# Patient Record
Sex: FEMALE | Race: WHITE | NOT HISPANIC OR LATINO | Employment: OTHER | URBAN - METROPOLITAN AREA
[De-identification: names, ages, dates, MRNs, and addresses within clinical notes are randomized per-mention and may not be internally consistent; named-entity substitution may affect disease eponyms.]

---

## 2023-03-08 DIAGNOSIS — M79.7 FIBROMYALGIA: Primary | ICD-10-CM

## 2023-03-08 RX ORDER — DULOXETIN HYDROCHLORIDE 60 MG/1
60 CAPSULE, DELAYED RELEASE ORAL 2 TIMES DAILY
Qty: 60 CAPSULE | Refills: 1 | Status: SHIPPED | OUTPATIENT
Start: 2023-03-08 | End: 2023-04-03 | Stop reason: SDUPTHER

## 2023-03-08 RX ORDER — DULOXETIN HYDROCHLORIDE 60 MG/1
1 CAPSULE, DELAYED RELEASE ORAL 2 TIMES DAILY
COMMUNITY
Start: 2014-03-03 | End: 2023-03-08 | Stop reason: SDUPTHER

## 2023-04-03 DIAGNOSIS — M79.7 FIBROMYALGIA: ICD-10-CM

## 2023-04-03 RX ORDER — DULOXETIN HYDROCHLORIDE 20 MG/1
2 CAPSULE, DELAYED RELEASE ORAL
COMMUNITY
Start: 2023-01-13 | End: 2023-04-03 | Stop reason: SDUPTHER

## 2023-04-03 RX ORDER — DULOXETIN HYDROCHLORIDE 20 MG/1
40 CAPSULE, DELAYED RELEASE ORAL
Qty: 60 CAPSULE | Refills: 11 | Status: SHIPPED | OUTPATIENT
Start: 2023-04-03 | End: 2023-04-26 | Stop reason: SDUPTHER

## 2023-04-26 DIAGNOSIS — M79.7 FIBROMYALGIA: ICD-10-CM

## 2023-04-26 RX ORDER — DULOXETIN HYDROCHLORIDE 20 MG/1
40 CAPSULE, DELAYED RELEASE ORAL
Qty: 180 CAPSULE | Refills: 3 | Status: SHIPPED | OUTPATIENT
Start: 2023-04-26 | End: 2024-03-06 | Stop reason: SDUPTHER

## 2023-05-08 ENCOUNTER — PATIENT OUTREACH (OUTPATIENT)
Dept: CARE COORDINATION | Facility: CLINIC | Age: 63
End: 2023-05-08
Payer: COMMERCIAL

## 2023-05-08 ENCOUNTER — DOCUMENTATION (OUTPATIENT)
Dept: PRIMARY CARE | Facility: CLINIC | Age: 63
End: 2023-05-08
Payer: COMMERCIAL

## 2023-05-08 RX ORDER — OMEPRAZOLE 40 MG/1
40 CAPSULE, DELAYED RELEASE ORAL
COMMUNITY
End: 2023-05-12 | Stop reason: WASHOUT

## 2023-05-08 RX ORDER — CHOLECALCIFEROL (VITAMIN D3) 25 MCG
2000 TABLET ORAL
COMMUNITY
Start: 2023-01-13 | End: 2024-06-06 | Stop reason: WASHOUT

## 2023-05-08 RX ORDER — ATORVASTATIN CALCIUM 10 MG/1
10 TABLET, FILM COATED ORAL
COMMUNITY
Start: 2021-02-01 | End: 2023-06-12

## 2023-05-08 RX ORDER — LISINOPRIL 10 MG/1
1 TABLET ORAL DAILY
COMMUNITY
Start: 2014-10-15 | End: 2023-07-12

## 2023-05-08 NOTE — PROGRESS NOTES
Discharge Facility: [facility]  Discharge Diagnosis: pancreatitis  Admission Date: 5/5/23  Discharge Date: 5/7/23    PCP Appointment Date: 5/12/23  Specialist Appointment Date:  TBD with GI  Hospital Encounter and Summary: Linked   See discharge assessment below for further details    Engagement  Call Start Time: 0959 (5/8/2023 10:11 AM)    Medications  Medications reviewed with patient/caregiver?: Yes (5/8/2023 10:11 AM)  Is the patient having any side effects they believe may be caused by any medication additions or changes?: No (5/8/2023 10:11 AM)  Does the patient have all medications ordered at discharge?: Yes (5/8/2023 10:11 AM)  Care Management Interventions: No intervention needed (5/8/2023 10:11 AM)  Prescription Comments: No changes to medications (5/8/2023 10:11 AM)  Is the patient taking all medications as directed (includes completed medication regime)?: Yes (5/8/2023 10:11 AM)    Appointments  Does the patient have a primary care provider?: Yes (5/8/2023 10:11 AM)  Care Management Interventions: Verified appointment date/time/provider (5/8/2023 10:11 AM)  Has the patient kept scheduled appointments due by today?: Yes (5/8/2023 10:11 AM)  Follow Up Tasks: -- (She will follow with her GI provider) (5/8/2023 10:11 AM)    Patient Teaching  Does the patient have access to their discharge instructions?: Yes (5/8/2023 10:11 AM)  Care Management Interventions: Reviewed instructions with patient (5/8/2023 10:11 AM)  What is the patient's perception of their health status since discharge?: Improving (5/8/2023 10:11 AM)  Is the patient/caregiver able to teach back the hierarchy of who to call/visit for symptoms/problems? PCP, Specialist, Home Health nurse, Urgent Care, ED, 911: Yes (5/8/2023 10:11 AM)  Patient/Caregiver Education Comments: Patient is doing better, tolerating small amounts of food gradually in order to prevent pancreatitis flare up. (5/8/2023 10:11 AM)    Wrap Up  Call End Time: 1007  (5/8/2023 10:11 AM)

## 2023-05-09 PROBLEM — R25.1 TREMOR OF BOTH HANDS: Status: ACTIVE | Noted: 2023-05-09

## 2023-05-09 PROBLEM — F33.1 MAJOR DEPRESSIVE DISORDER, RECURRENT EPISODE, MODERATE WITH ANXIOUS DISTRESS (MULTI): Status: ACTIVE | Noted: 2023-05-09

## 2023-05-09 PROBLEM — R73.9 ELEVATED BLOOD SUGAR: Status: ACTIVE | Noted: 2023-05-07

## 2023-05-09 PROBLEM — M17.9 OA (OSTEOARTHRITIS) OF KNEE: Status: ACTIVE | Noted: 2019-08-13

## 2023-05-09 PROBLEM — N95.1 PERIMENOPAUSAL SYMPTOMS: Status: ACTIVE | Noted: 2023-05-09

## 2023-05-09 PROBLEM — E66.9 OBESITY, CLASS II, BMI 35-39.9: Status: ACTIVE | Noted: 2020-10-15

## 2023-05-09 PROBLEM — K76.0 HEPATIC STEATOSIS: Status: ACTIVE | Noted: 2023-05-07

## 2023-05-09 PROBLEM — N60.01 CYST OF BOTH BREASTS: Status: ACTIVE | Noted: 2023-05-09

## 2023-05-09 PROBLEM — F32.A DEPRESSION: Status: ACTIVE | Noted: 2023-05-07

## 2023-05-09 PROBLEM — E78.00 HYPERCHOLESTEREMIA: Status: ACTIVE | Noted: 2023-05-09

## 2023-05-09 PROBLEM — D36.13: Status: ACTIVE | Noted: 2023-05-09

## 2023-05-09 PROBLEM — J30.9 ALLERGIC RHINITIS: Status: ACTIVE | Noted: 2023-05-09

## 2023-05-09 PROBLEM — R92.8 ABNORMAL MAMMOGRAM: Status: ACTIVE | Noted: 2023-05-09

## 2023-05-09 PROBLEM — K58.9 IBS (IRRITABLE BOWEL SYNDROME): Status: ACTIVE | Noted: 2023-05-07

## 2023-05-09 PROBLEM — G47.33 OSA (OBSTRUCTIVE SLEEP APNEA): Status: ACTIVE | Noted: 2023-05-09

## 2023-05-09 PROBLEM — M19.90 ARTHRITIS: Status: ACTIVE | Noted: 2023-05-07

## 2023-05-09 PROBLEM — K21.9 ESOPHAGEAL REFLUX: Status: ACTIVE | Noted: 2023-05-09

## 2023-05-09 PROBLEM — E66.813 CLASS 3 SEVERE OBESITY IN ADULT: Status: ACTIVE | Noted: 2023-05-07

## 2023-05-09 PROBLEM — F33.2 MODERATELY SEVERE RECURRENT MAJOR DEPRESSION (MULTI): Status: ACTIVE | Noted: 2023-05-09

## 2023-05-09 PROBLEM — I10 PRIMARY HYPERTENSION: Status: ACTIVE | Noted: 2023-05-07

## 2023-05-09 PROBLEM — M17.0 PRIMARY OSTEOARTHRITIS OF BOTH KNEES: Status: ACTIVE | Noted: 2019-01-29

## 2023-05-09 PROBLEM — I65.29 CAROTID ARTERY STENOSIS: Status: ACTIVE | Noted: 2023-05-09

## 2023-05-09 PROBLEM — E66.01 CLASS 3 SEVERE OBESITY IN ADULT (MULTI): Status: ACTIVE | Noted: 2023-05-07

## 2023-05-09 PROBLEM — N60.02 CYST OF BOTH BREASTS: Status: ACTIVE | Noted: 2023-05-09

## 2023-05-09 PROBLEM — E66.812 OBESITY, CLASS II, BMI 35-39.9: Status: ACTIVE | Noted: 2020-10-15

## 2023-05-09 PROBLEM — K85.90 PANCREATITIS, UNSPECIFIED PANCREATITIS TYPE (HHS-HCC): Status: ACTIVE | Noted: 2023-05-05

## 2023-05-09 PROBLEM — F41.9 ANXIETY, MILD: Status: ACTIVE | Noted: 2023-05-09

## 2023-05-09 NOTE — PROGRESS NOTES
"  Subjective:    Verito Plasencia is a 62 y.o. female who presents for   Chief Complaint   Patient presents with    Hospital Follow-up         HPI:        Name of GI specialist and date of follow up-   Dr. Orantes- does not have apt set up with him yet   [facility] - pt will schedule       Pt states that she does not have the pain that she had last week- but states that she is \"weird/odd\"    Pt is here for f/up hosp-5/7-5/12/23    Dx - pancreatitis   HTN  Elevated blood sugar- 101 , Oldc5x=0.7  Hyponatremia-133  Elevated bili-2.0  Hepatic steatosis  fibromyalgia      Testing- flu, RSV and COVID neg   Trig=60    CT abd/pelvis- pancreatitis vs duodenitis    Abd Ul/s- no acute changes, possible fatty liver     /85          Hx depression- stable on duloxetine       Social History     Tobacco Use   Smoking Status Never   Smokeless Tobacco Never   Vaping Use   Vaping Status Not on file         Review of Systems:      Objective:    Vitals:    05/12/23 1401   BP: 98/66   BP Location: Left arm   Patient Position: Sitting   BP Cuff Size: Large adult   Pulse: 78   Resp: 14   Temp: 36.1 °C (97 °F)   TempSrc: Temporal   SpO2: 97%   Weight: 113 kg (248 lb 4.8 oz)     Discussed BP is on the low side    No fevers      Pt is A and O x3, NAD, nontoxic, well-hydrated   Head- normocephalic and atraumatic,   EYES- conjunctiva- normal   lids- normal  EARS/NOSE- normal external exam   CV- RRR without murmur  PULM- CTA bilaterally, normal respiratory effort  RESPIRATORY EFFORT- normal , no retractions or nasal flaring   ABD- normoactive BS's , soft, no HSM, no CVAT, NT   EXT- no edema,NT  SKIN- no abnormal skin lesions noted  NEURO- no focal deficits  PSYCH- pleasant, normal judgement and insight        The number and complexity of problems addressed is considered moderate.  The amount and/or complexity of data reviewed and analyzed is considered moderate. The risk of complications and/or morbidity/mortality of patient is " considered moderate. Overall, this patient encounter is considered a moderate risk visit.    Side Effects Medication-     Common side effects or health problems may include:    Headache  Nausea  Insomnia  Weakness  Dizziness  Ejaculation disorder  Drowsiness  Dry mouth  Sweating  Loss of appetite  Nervousness  Inability to orgasm  Weight loss  Abnormal vision  High blood pressure (hypertension)  Impotence  Numbness and tingling  Tremor  Vasodilation  Vomiting  Weight gain  Gas (flatulence)  Itching  Yawning  Indigestion  Twitching  Dilated pupils  Other side effects of venlafaxine include:    Agranulocytosis  Anemia  Aneurism  Bacteremia  Fainting  Muscle weakness  Reduced urination  Skin swelling  Suicide ideation/attempt          Assessment/Plan:    1. Hospital discharge follow-up        2. Acute pancreatitis, unspecified complication status, unspecified pancreatitis type        3. Primary hypertension  Basic Metabolic Panel      4. Elevated blood sugar        5. Prediabetes        6. Hyponatremia        7. Elevated bilirubin  Hepatic Function Panel      8. Fibromyalgia        9. VERO (obstructive sleep apnea)        10. Depression, unspecified depression type        11. Hepatic steatosis  Hepatic Function Panel          Orders Placed This Encounter   Procedures    Basic Metabolic Panel    Hepatic Function Panel       Follow up GI- as directed     Increase fluids /electrolytes       Call if no better or if symptoms worsen      Follow up 2-3 weeks - recheck BP

## 2023-05-10 PROBLEM — F33.1 MAJOR DEPRESSIVE DISORDER, RECURRENT EPISODE, MODERATE WITH ANXIOUS DISTRESS (MULTI): Chronic | Status: ACTIVE | Noted: 2023-05-09

## 2023-05-10 PROBLEM — F41.9 ANXIETY, MILD: Chronic | Status: ACTIVE | Noted: 2023-05-09

## 2023-05-10 PROBLEM — G47.33 OSA (OBSTRUCTIVE SLEEP APNEA): Chronic | Status: ACTIVE | Noted: 2023-05-09

## 2023-05-10 PROBLEM — F32.A DEPRESSION: Chronic | Status: ACTIVE | Noted: 2023-05-07

## 2023-05-10 PROBLEM — R73.9 ELEVATED BLOOD SUGAR: Chronic | Status: ACTIVE | Noted: 2023-05-07

## 2023-05-10 PROBLEM — M17.9 OA (OSTEOARTHRITIS) OF KNEE: Chronic | Status: ACTIVE | Noted: 2019-08-13

## 2023-05-10 PROBLEM — I65.29 CAROTID ARTERY STENOSIS: Chronic | Status: ACTIVE | Noted: 2023-05-09

## 2023-05-10 PROBLEM — R17 ELEVATED BILIRUBIN: Status: ACTIVE | Noted: 2023-05-10

## 2023-05-10 PROBLEM — E87.1 HYPONATREMIA: Status: ACTIVE | Noted: 2023-05-10

## 2023-05-10 PROBLEM — M19.90 ARTHRITIS: Chronic | Status: ACTIVE | Noted: 2023-05-07

## 2023-05-10 PROBLEM — J30.9 ALLERGIC RHINITIS: Chronic | Status: ACTIVE | Noted: 2023-05-09

## 2023-05-10 PROBLEM — R92.8 ABNORMAL MAMMOGRAM: Chronic | Status: ACTIVE | Noted: 2023-05-09

## 2023-05-10 PROBLEM — K58.9 IBS (IRRITABLE BOWEL SYNDROME): Chronic | Status: ACTIVE | Noted: 2023-05-07

## 2023-05-10 PROBLEM — I10 PRIMARY HYPERTENSION: Chronic | Status: ACTIVE | Noted: 2023-05-07

## 2023-05-10 PROBLEM — M79.7 FIBROMYALGIA: Chronic | Status: ACTIVE | Noted: 2023-03-08

## 2023-05-10 PROBLEM — E78.00 HYPERCHOLESTEREMIA: Chronic | Status: ACTIVE | Noted: 2023-05-09

## 2023-05-10 PROBLEM — K85.90 ACUTE PANCREATITIS (HHS-HCC): Status: ACTIVE | Noted: 2023-05-10

## 2023-05-10 PROBLEM — K85.90 PANCREATITIS, UNSPECIFIED PANCREATITIS TYPE (HHS-HCC): Chronic | Status: ACTIVE | Noted: 2023-05-05

## 2023-05-10 PROBLEM — R73.03 PREDIABETES: Status: ACTIVE | Noted: 2023-05-10

## 2023-05-10 RX ORDER — IBUPROFEN 100 MG/5ML
1000 SUSPENSION, ORAL (FINAL DOSE FORM) ORAL
COMMUNITY
End: 2023-05-12 | Stop reason: WASHOUT

## 2023-05-10 RX ORDER — LANOLIN ALCOHOL/MO/W.PET/CERES
400 CREAM (GRAM) TOPICAL DAILY
COMMUNITY
End: 2023-05-12 | Stop reason: WASHOUT

## 2023-05-12 ENCOUNTER — LAB (OUTPATIENT)
Dept: LAB | Facility: LAB | Age: 63
End: 2023-05-12
Payer: COMMERCIAL

## 2023-05-12 ENCOUNTER — OFFICE VISIT (OUTPATIENT)
Dept: PRIMARY CARE | Facility: CLINIC | Age: 63
End: 2023-05-12
Payer: COMMERCIAL

## 2023-05-12 VITALS
BODY MASS INDEX: 40.69 KG/M2 | DIASTOLIC BLOOD PRESSURE: 66 MMHG | WEIGHT: 248.3 LBS | OXYGEN SATURATION: 97 % | RESPIRATION RATE: 14 BRPM | TEMPERATURE: 97 F | SYSTOLIC BLOOD PRESSURE: 98 MMHG | HEART RATE: 78 BPM

## 2023-05-12 DIAGNOSIS — K76.0 HEPATIC STEATOSIS: ICD-10-CM

## 2023-05-12 DIAGNOSIS — M79.7 FIBROMYALGIA: ICD-10-CM

## 2023-05-12 DIAGNOSIS — I10 PRIMARY HYPERTENSION: ICD-10-CM

## 2023-05-12 DIAGNOSIS — K85.90 ACUTE PANCREATITIS, UNSPECIFIED COMPLICATION STATUS, UNSPECIFIED PANCREATITIS TYPE (HHS-HCC): ICD-10-CM

## 2023-05-12 DIAGNOSIS — R73.03 PREDIABETES: ICD-10-CM

## 2023-05-12 DIAGNOSIS — E87.1 HYPONATREMIA: ICD-10-CM

## 2023-05-12 DIAGNOSIS — Z09 HOSPITAL DISCHARGE FOLLOW-UP: Primary | ICD-10-CM

## 2023-05-12 DIAGNOSIS — R17 ELEVATED BILIRUBIN: ICD-10-CM

## 2023-05-12 DIAGNOSIS — F32.A DEPRESSION, UNSPECIFIED DEPRESSION TYPE: Chronic | ICD-10-CM

## 2023-05-12 DIAGNOSIS — G47.33 OSA (OBSTRUCTIVE SLEEP APNEA): Chronic | ICD-10-CM

## 2023-05-12 DIAGNOSIS — R73.9 ELEVATED BLOOD SUGAR: ICD-10-CM

## 2023-05-12 PROCEDURE — 99496 TRANSJ CARE MGMT HIGH F2F 7D: CPT | Performed by: FAMILY MEDICINE

## 2023-05-12 PROCEDURE — 1036F TOBACCO NON-USER: CPT | Performed by: FAMILY MEDICINE

## 2023-05-12 PROCEDURE — 80076 HEPATIC FUNCTION PANEL: CPT

## 2023-05-12 PROCEDURE — 3074F SYST BP LT 130 MM HG: CPT | Performed by: FAMILY MEDICINE

## 2023-05-12 PROCEDURE — 80048 BASIC METABOLIC PNL TOTAL CA: CPT

## 2023-05-12 PROCEDURE — 36415 COLL VENOUS BLD VENIPUNCTURE: CPT

## 2023-05-12 PROCEDURE — 3078F DIAST BP <80 MM HG: CPT | Performed by: FAMILY MEDICINE

## 2023-05-12 ASSESSMENT — PATIENT HEALTH QUESTIONNAIRE - PHQ9
2. FEELING DOWN, DEPRESSED OR HOPELESS: NOT AT ALL
1. LITTLE INTEREST OR PLEASURE IN DOING THINGS: NOT AT ALL
SUM OF ALL RESPONSES TO PHQ9 QUESTIONS 1 AND 2: 0

## 2023-05-13 LAB
ALANINE AMINOTRANSFERASE (SGPT) (U/L) IN SER/PLAS: 17 U/L (ref 7–45)
ALBUMIN (G/DL) IN SER/PLAS: 4 G/DL (ref 3.4–5)
ALKALINE PHOSPHATASE (U/L) IN SER/PLAS: 66 U/L (ref 33–136)
ANION GAP IN SER/PLAS: 15 MMOL/L (ref 10–20)
ASPARTATE AMINOTRANSFERASE (SGOT) (U/L) IN SER/PLAS: 21 U/L (ref 9–39)
BILIRUBIN DIRECT (MG/DL) IN SER/PLAS: 0.2 MG/DL (ref 0–0.3)
BILIRUBIN TOTAL (MG/DL) IN SER/PLAS: 1 MG/DL (ref 0–1.2)
CALCIUM (MG/DL) IN SER/PLAS: 10.3 MG/DL (ref 8.6–10.6)
CARBON DIOXIDE, TOTAL (MMOL/L) IN SER/PLAS: 27 MMOL/L (ref 21–32)
CHLORIDE (MMOL/L) IN SER/PLAS: 103 MMOL/L (ref 98–107)
CREATININE (MG/DL) IN SER/PLAS: 1.02 MG/DL (ref 0.5–1.05)
GFR FEMALE: 62 ML/MIN/1.73M2
GLUCOSE (MG/DL) IN SER/PLAS: 75 MG/DL (ref 74–99)
POTASSIUM (MMOL/L) IN SER/PLAS: 5 MMOL/L (ref 3.5–5.3)
PROTEIN TOTAL: 6.6 G/DL (ref 6.4–8.2)
SODIUM (MMOL/L) IN SER/PLAS: 140 MMOL/L (ref 136–145)
UREA NITROGEN (MG/DL) IN SER/PLAS: 15 MG/DL (ref 6–23)

## 2023-05-17 ENCOUNTER — PATIENT OUTREACH (OUTPATIENT)
Dept: CARE COORDINATION | Facility: CLINIC | Age: 63
End: 2023-05-17
Payer: COMMERCIAL

## 2023-05-17 NOTE — PROGRESS NOTES
Call regarding appt. with PCP on 5/12/23 after hospitalization.  At time of outreach call the patient feels as if their condition has worsened since last visit.  Reviewed the PCP appointment with the pt and addressed any questions or concerns.   She states she is having vomiting about 30 min after she eats now and this started recently, she is working with GI office to be seen sooner than 6/7. She is able to keep body armor and water down. She states her BP at home has been elevated, when she was at PCP office 5/12 her BP was in 90s systolic but when she came home she checked it with her home cuff and was 178/80, then next day 156/80. She states baseline is 130s systolic. Will forward message onto PCP to let her know. Advised patient to call or seek care with any worsening changes or inability to keep fluid down.

## 2023-05-18 NOTE — PROGRESS NOTES
Called and spoke with pt, and pt states that she was advised to take pepto bismol, and that this was just a flare up and that she would be fine until her GI could see her. She also states that she was advised to go to the ED only if she has severe pain when bending over. She states that he ANGELA blackwell is the one who told her this.

## 2023-05-23 NOTE — PROGRESS NOTES
"SUBJECTIVE:      Verito Plasencia. Is 62 y.o.. female. Here for follow up office visit-     HPI:    Pt states that she is feeling the same as when she was here last     Follow up for recheck BP    5/12/23 was 98/66 at post hosp ov       On lisinopril 10 every day       Has pt seen Dr Orantes yet?  No but states that her apt is next week     Pt is doing well      REVIEW OF SYSTEMS:        OBJECTIVE:    Vitals:    05/30/23 1028   BP: 130/78   BP Location: Left arm   Patient Position: Sitting   BP Cuff Size: Large adult   Pulse: 56   Resp: 14   Temp: 36.3 °C (97.4 °F)   TempSrc: Temporal   SpO2: 97%   Weight: 111 kg (245 lb 4.8 oz)   Height: 1.676 m (5' 6\")       PHYSICAL:      Patient is alert and oriented x 3 , NAD  HEAD- normocephalic and atraumatic   EYES-conjunctiva-normal, lids - normal  EARS/NOSE- normal external exam   NECK-supple,FROM  CV- RRR without murmur  PULM- CTA bilaterally, normal respiratory effort  RESPIRATORY EFFORT- normal , no retractions or nasal flaring   ABD- normoactive BS's   EXT- no edema,NT  SKIN- no abnormal skin lesions noted  NEURO- no focal deficits  PSYCH- pleasant, normal judgement and insight        The number and complexity of problems addressed is considered moderate.  The amount and/or complexity of data reviewed and analyzed is considered moderate. The risk of complications and/or morbidity/mortality of patient is considered moderate. Overall, this patient encounter is considered a moderate risk visit.      Common Side Effects- ACEI    Cough  Dizziness  Feeling tired  Headache  Problems sleeping  Fast heart beat    Call your doctor if you have any of these signs:  Chest pain  Problems breathing or swallowing  Swelling in the face, eyes, lips, tongue, or legs            ASSESSMENT/PLAN:    Problem List Items Addressed This Visit          Active Problems    Depression (Chronic)    Relevant Orders    Follow Up In [facility] - PCP    Hypercholesteremia (Chronic)    Relevant " Orders    Follow Up In [facility] - PCP    VERO (obstructive sleep apnea) (Chronic)    Relevant Orders    Follow Up In [facility] - PCP    Prediabetes    Relevant Orders    Follow Up In [facility] - PCP    Primary hypertension - Primary (Chronic)    Relevant Orders    Follow Up In [facility] - PCP                   Continue medication      Ordered lab      Follow up in 3 months

## 2023-05-30 ENCOUNTER — OFFICE VISIT (OUTPATIENT)
Dept: PRIMARY CARE | Facility: CLINIC | Age: 63
End: 2023-05-30
Payer: COMMERCIAL

## 2023-05-30 VITALS
WEIGHT: 245.3 LBS | SYSTOLIC BLOOD PRESSURE: 130 MMHG | TEMPERATURE: 97.4 F | DIASTOLIC BLOOD PRESSURE: 78 MMHG | BODY MASS INDEX: 39.42 KG/M2 | RESPIRATION RATE: 14 BRPM | HEART RATE: 56 BPM | OXYGEN SATURATION: 97 % | HEIGHT: 66 IN

## 2023-05-30 DIAGNOSIS — E78.00 HYPERCHOLESTEREMIA: Chronic | ICD-10-CM

## 2023-05-30 DIAGNOSIS — F32.A DEPRESSION, UNSPECIFIED DEPRESSION TYPE: Chronic | ICD-10-CM

## 2023-05-30 DIAGNOSIS — G47.33 OSA (OBSTRUCTIVE SLEEP APNEA): ICD-10-CM

## 2023-05-30 DIAGNOSIS — I10 PRIMARY HYPERTENSION: Primary | Chronic | ICD-10-CM

## 2023-05-30 DIAGNOSIS — R73.03 PREDIABETES: ICD-10-CM

## 2023-05-30 PROCEDURE — 99214 OFFICE O/P EST MOD 30 MIN: CPT | Performed by: FAMILY MEDICINE

## 2023-05-30 PROCEDURE — 3078F DIAST BP <80 MM HG: CPT | Performed by: FAMILY MEDICINE

## 2023-05-30 PROCEDURE — 1036F TOBACCO NON-USER: CPT | Performed by: FAMILY MEDICINE

## 2023-05-30 PROCEDURE — 3075F SYST BP GE 130 - 139MM HG: CPT | Performed by: FAMILY MEDICINE

## 2023-06-12 DIAGNOSIS — E78.00 HYPERCHOLESTEREMIA: Primary | Chronic | ICD-10-CM

## 2023-06-12 RX ORDER — ATORVASTATIN CALCIUM 10 MG/1
TABLET, FILM COATED ORAL
Qty: 90 TABLET | Refills: 3 | Status: SHIPPED | OUTPATIENT
Start: 2023-06-12 | End: 2024-03-06 | Stop reason: SDUPTHER

## 2023-06-19 NOTE — TELEPHONE ENCOUNTER
When Cymbalta was abstracted, the incorrect dose was pulled.  PT was sent 60 mg of duloxetine instead of 20 mg 2 capsules every day.  She needs new RX for the decreased dose as she is trying to wean off this medication.      Please send to [facility]    PT needs short Rx sent to local pharmacy as she is out of the 20 mg [facility]   To get better and follow your care plan as instructed.

## 2023-06-21 ENCOUNTER — PATIENT OUTREACH (OUTPATIENT)
Dept: CARE COORDINATION | Facility: CLINIC | Age: 63
End: 2023-06-21
Payer: COMMERCIAL

## 2023-07-12 DIAGNOSIS — I10 PRIMARY HYPERTENSION: Primary | Chronic | ICD-10-CM

## 2023-07-12 RX ORDER — LISINOPRIL 10 MG/1
TABLET ORAL
Qty: 90 TABLET | Refills: 3 | Status: SHIPPED | OUTPATIENT
Start: 2023-07-12 | End: 2024-03-06 | Stop reason: SDUPTHER

## 2023-07-27 ENCOUNTER — PATIENT OUTREACH (OUTPATIENT)
Dept: CARE COORDINATION | Facility: CLINIC | Age: 63
End: 2023-07-27
Payer: COMMERCIAL

## 2023-07-27 NOTE — PROGRESS NOTES
Call placed regarding two month post discharge follow up call.  At time of outreach call the patient feels as if their condition has returned to baseline since initial visit with PCP or specialist.  Questions or concerns regarding recovery period addressed at this time.   Reviewed any PCP or specialists progress notes/labs/radiology reports if applicable and addressed any questions or concerns.   She states she will be getting an MRI on her L foot through Dr Tahira Colón on Monday.

## 2023-08-22 PROBLEM — Z11.59 ENCOUNTER FOR HEPATITIS C SCREENING TEST FOR LOW RISK PATIENT: Status: ACTIVE | Noted: 2023-08-22

## 2023-08-22 PROBLEM — Z11.59 ENCOUNTER FOR HEPATITIS C SCREENING TEST FOR LOW RISK PATIENT: Chronic | Status: ACTIVE | Noted: 2023-08-22

## 2023-08-25 NOTE — PROGRESS NOTES
follSubjective        Verito Plasencia. Is 63 y.o.. female. Here for follow up office visit-       Chief Complaint   Patient presents with    Follow-up    Hypertension    Hyperlipidemia    Depression    Sleep Apnea    Prediabetes       HPI:        Follow up for BP, VERO, prediabetes, cholesterol , depression       Pt is here to recheck BP - on Lisinopril       BP was 98/66, then 130/78       Pt is doing well      Pt had labs 5/2023      Other medical specialists are involved in patient's care.    Any recent notes that were available were reviewed.    All conditions are monitored, evaluated and assessed regularly.    Patient is compliant with current treatment regimen/medications.    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 [employer]       Pt had  EGD- August Dr Altman - diagnosis esophagitis and hiatal hernia       Lab on 05/12/2023   Component Date Value Ref Range Status    Glucose 05/12/2023 75  74 - 99 mg/dL Final    Sodium 05/12/2023 140  136 - 145 mmol/L Final    Potassium 05/12/2023 5.0  3.5 - 5.3 mmol/L Final    Chloride 05/12/2023 103  98 - 107 mmol/L Final    Bicarbonate 05/12/2023 27  21 - 32 mmol/L Final    Anion Gap 05/12/2023 15  10 - 20 mmol/L Final    Urea Nitrogen 05/12/2023 15  6 - 23 mg/dL Final    Creatinine 05/12/2023 1.02  0.50 - 1.05 mg/dL Final    GFR Female 05/12/2023 62  >90 mL/min/1.73m2 Final     CALCULATIONS OF ESTIMATED GFR ARE PERFORMED   USING THE 2021 CKD-EPI STUDY REFIT EQUATION   WITHOUT THE RACE VARIABLE FOR THE IDMS-TRACEABLE   CREATININE METHODS.    https://jasn.asnjournals.org/content/early/2021/09/22/ASN.2371392884    Calcium 05/12/2023 10.3  8.6 - 10.6 mg/dL Final    Albumin 05/12/2023 4.0  3.4 - 5.0 g/dL Final    Total Bilirubin 05/12/2023 1.0  0.0 - 1.2 mg/dL Final    Bilirubin, Direct 05/12/2023 0.2  0.0 - 0.3 mg/dL Final    Alkaline Phosphatase  05/12/2023 66  33 - 136 U/L Final    ALT (SGPT) 05/12/2023 17  7 - 45 U/L Final     Patients treated with Sulfasalazine may generate    falsely decreased results for ALT.    AST 05/12/2023 21  9 - 39 U/L Final    Total Protein 05/12/2023 6.6  6.4 - 8.2 g/dL Final         REVIEW OF SYSTEMS:        Objective      Vitals:    09/05/23 1424   BP: 122/73   BP Location: Left arm   Patient Position: Sitting   BP Cuff Size: Large adult   Pulse: 72   Temp: 36.4 °C (97.6 °F)   TempSrc: Temporal   SpO2: 96%   Weight: 112 kg (246 lb 4.8 oz)       PHYSICAL:  Walking with a cane    Getting fitted for brace- has drop foot - seeing specialist     Patient is alert and oriented x 3 , NAD  HEAD- normocephalic and atraumatic   EYES-conjunctiva-normal, lids - normal  EARS/NOSE- normal external exam   NECK-supple,FROM  CV- RRR without murmur  PULM- CTA bilaterally, normal respiratory effort  RESPIRATORY EFFORT- normal , no retractions or nasal flaring   ABD- normoactive BS's   EXT- no edema,NT  SKIN- no abnormal skin lesions noted  NEURO- no focal deficits  PSYCH- pleasant, normal judgement and insight      BP Readings from Last 3 Encounters:   09/05/23 122/73   05/30/23 130/78   05/12/23 98/66             Wt Readings from Last 3 Encounters:   09/05/23 112 kg (246 lb 4.8 oz)   05/30/23 111 kg (245 lb 4.8 oz)   05/12/23 113 kg (248 lb 4.8 oz)           BMI Readings from Last 3 Encounters:   09/05/23 39.75 kg/m²   05/30/23 39.59 kg/m²   05/12/23 40.69 kg/m²               The number and complexity of problems addressed is considered moderate.  The amount and/or complexity of data reviewed and analyzed is considered moderate. The risk of complications and/or morbidity/mortality of patient is considered moderate. Overall, this patient encounter is considered a moderate risk visit.    Common Side Effects- ACEI    Cough  Dizziness  Feeling tired  Headache  Problems sleeping  Fast heart beat    Call your doctor if you have any of these  signs:  Chest pain  Problems breathing or swallowing  Swelling in the face, eyes, lips, tongue, or legs  Patient's BMI is elevated.  Plan- diet and exercise- BMI is elevated. Need to increase activity on a daily basis especially walking.  Monitor  total calories per day- decrease carbohydrates and fats. Goal - lose 1-2 pounds per week.    Recommend 150 minutes of moderate-intensity exercise as tolerated per week and 2-3 days of resistance, flexibility, and neuromotor exercises per week.    Normal BMI- 18.5-25    Overweight=  BMI 26-29    Obese= BMI 30-39    Morbidly Obese = BMI >40          Assessment/Plan      Problem List Items Addressed This Visit          Active Problems    Depression (Chronic)    Hypercholesteremia (Chronic)    Relevant Orders    Follow Up In [facility] - PCP - Established    VERO (obstructive sleep apnea) (Chronic)    Prediabetes    Primary hypertension - Primary (Chronic)     Other Visit Diagnoses       Medication management        Class 2 obesity with body mass index (BMI) of 39.0 to 39.9 in adult, unspecified obesity type, unspecified whether serious comorbidity present                Continue medications             Follow up in 6 months

## 2023-09-05 ENCOUNTER — OFFICE VISIT (OUTPATIENT)
Dept: PRIMARY CARE | Facility: CLINIC | Age: 63
End: 2023-09-05
Payer: COMMERCIAL

## 2023-09-05 VITALS
OXYGEN SATURATION: 96 % | DIASTOLIC BLOOD PRESSURE: 73 MMHG | WEIGHT: 246.3 LBS | TEMPERATURE: 97.6 F | BODY MASS INDEX: 39.75 KG/M2 | SYSTOLIC BLOOD PRESSURE: 122 MMHG | HEART RATE: 72 BPM

## 2023-09-05 DIAGNOSIS — G47.33 OSA (OBSTRUCTIVE SLEEP APNEA): Chronic | ICD-10-CM

## 2023-09-05 DIAGNOSIS — E78.00 HYPERCHOLESTEREMIA: Chronic | ICD-10-CM

## 2023-09-05 DIAGNOSIS — I10 PRIMARY HYPERTENSION: Primary | Chronic | ICD-10-CM

## 2023-09-05 DIAGNOSIS — E66.9 CLASS 2 OBESITY WITH BODY MASS INDEX (BMI) OF 39.0 TO 39.9 IN ADULT, UNSPECIFIED OBESITY TYPE, UNSPECIFIED WHETHER SERIOUS COMORBIDITY PRESENT: ICD-10-CM

## 2023-09-05 DIAGNOSIS — F32.A DEPRESSION, UNSPECIFIED DEPRESSION TYPE: Chronic | ICD-10-CM

## 2023-09-05 DIAGNOSIS — R73.03 PREDIABETES: ICD-10-CM

## 2023-09-05 DIAGNOSIS — Z79.899 MEDICATION MANAGEMENT: ICD-10-CM

## 2023-09-05 PROCEDURE — 3008F BODY MASS INDEX DOCD: CPT | Performed by: FAMILY MEDICINE

## 2023-09-05 PROCEDURE — 1036F TOBACCO NON-USER: CPT | Performed by: FAMILY MEDICINE

## 2023-09-05 PROCEDURE — 99214 OFFICE O/P EST MOD 30 MIN: CPT | Performed by: FAMILY MEDICINE

## 2023-09-05 PROCEDURE — 3074F SYST BP LT 130 MM HG: CPT | Performed by: FAMILY MEDICINE

## 2023-09-05 PROCEDURE — 3078F DIAST BP <80 MM HG: CPT | Performed by: FAMILY MEDICINE

## 2023-09-05 RX ORDER — OMEPRAZOLE 40 MG/1
80 CAPSULE, DELAYED RELEASE ORAL 2 TIMES DAILY
COMMUNITY
Start: 2023-07-03

## 2023-09-05 ASSESSMENT — ANXIETY QUESTIONNAIRES
1. FEELING NERVOUS, ANXIOUS, OR ON EDGE: NOT AT ALL
7. FEELING AFRAID AS IF SOMETHING AWFUL MIGHT HAPPEN: NOT AT ALL
5. BEING SO RESTLESS THAT IT IS HARD TO SIT STILL: NOT AT ALL
2. NOT BEING ABLE TO STOP OR CONTROL WORRYING: NOT AT ALL
GAD7 TOTAL SCORE: 0
IF YOU CHECKED OFF ANY PROBLEMS ON THIS QUESTIONNAIRE, HOW DIFFICULT HAVE THESE PROBLEMS MADE IT FOR YOU TO DO YOUR WORK, TAKE CARE OF THINGS AT HOME, OR GET ALONG WITH OTHER PEOPLE: NOT DIFFICULT AT ALL
3. WORRYING TOO MUCH ABOUT DIFFERENT THINGS: NOT AT ALL
6. BECOMING EASILY ANNOYED OR IRRITABLE: NOT AT ALL
4. TROUBLE RELAXING: NOT AT ALL

## 2023-09-05 ASSESSMENT — PATIENT HEALTH QUESTIONNAIRE - PHQ9
6. FEELING BAD ABOUT YOURSELF - OR THAT YOU ARE A FAILURE OR HAVE LET YOURSELF OR YOUR FAMILY DOWN: NOT AT ALL
SUM OF ALL RESPONSES TO PHQ QUESTIONS 1-9: 2
4. FEELING TIRED OR HAVING LITTLE ENERGY: MORE THAN HALF THE DAYS
SUM OF ALL RESPONSES TO PHQ9 QUESTIONS 1 AND 2: 0
2. FEELING DOWN, DEPRESSED OR HOPELESS: NOT AT ALL
10. IF YOU CHECKED OFF ANY PROBLEMS, HOW DIFFICULT HAVE THESE PROBLEMS MADE IT FOR YOU TO DO YOUR WORK, TAKE CARE OF THINGS AT HOME, OR GET ALONG WITH OTHER PEOPLE: NOT DIFFICULT AT ALL
1. LITTLE INTEREST OR PLEASURE IN DOING THINGS: NOT AT ALL
7. TROUBLE CONCENTRATING ON THINGS, SUCH AS READING THE NEWSPAPER OR WATCHING TELEVISION: NOT AT ALL
5. POOR APPETITE OR OVEREATING: NOT AT ALL
3. TROUBLE FALLING OR STAYING ASLEEP OR SLEEPING TOO MUCH: NOT AT ALL
9. THOUGHTS THAT YOU WOULD BE BETTER OFF DEAD, OR OF HURTING YOURSELF: NOT AT ALL
8. MOVING OR SPEAKING SO SLOWLY THAT OTHER PEOPLE COULD HAVE NOTICED. OR THE OPPOSITE, BEING SO FIGETY OR RESTLESS THAT YOU HAVE BEEN MOVING AROUND A LOT MORE THAN USUAL: NOT AT ALL

## 2024-03-01 NOTE — PROGRESS NOTES
follSubjective        Verito Plasencia. Is 63 y.o.. female. Here for follow up office visit-       Chief Complaint   Patient presents with    Follow-up    Hypertension    Hyperlipidemia    Depression    Prediabetes    Apnea       HPI:        Follow up for BP, VERO, prediabetes, cholesterol , depression     Is pt using CPAP at this time?  No      Scales reviewed- some anxiety -  is retiring - worried about finances - will run out of insurance for one year starting July       Works 2.5 hrs at [employer]                      Pt is doing well        Doing well - with weight - down 20 pounds - but due to history pancreatitis     Pt feels well now       Pt had labs 5/2023      Due for labs       Other medical specialists are involved in patient's care.    Any recent notes that were available were reviewed.    All conditions are monitored, evaluated and assessed regularly.    Patient is compliant with current treatment regimen/medications.    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             Lab on 05/12/2023   Component Date Value Ref Range Status    Glucose 05/12/2023 75  74 - 99 mg/dL Final    Sodium 05/12/2023 140  136 - 145 mmol/L Final    Potassium 05/12/2023 5.0  3.5 - 5.3 mmol/L Final    Chloride 05/12/2023 103  98 - 107 mmol/L Final    Bicarbonate 05/12/2023 27  21 - 32 mmol/L Final    Anion Gap 05/12/2023 15  10 - 20 mmol/L Final    Urea Nitrogen 05/12/2023 15  6 - 23 mg/dL Final    Creatinine 05/12/2023 1.02  0.50 - 1.05 mg/dL Final    GFR Female 05/12/2023 62  >90 mL/min/1.73m2 Final     CALCULATIONS OF ESTIMATED GFR ARE PERFORMED   USING THE 2021 CKD-EPI STUDY REFIT EQUATION   WITHOUT THE RACE VARIABLE FOR THE IDMS-TRACEABLE   CREATININE  METHODS.    https://jasn.asnjournals.org/content/early/2021/09/22/ASN.4020306949    Calcium 05/12/2023 10.3  8.6 - 10.6 mg/dL Final    Albumin 05/12/2023 4.0  3.4 - 5.0 g/dL Final    Total Bilirubin 05/12/2023 1.0  0.0 - 1.2 mg/dL Final    Bilirubin, Direct 05/12/2023 0.2  0.0 - 0.3 mg/dL Final    Alkaline Phosphatase 05/12/2023 66  33 - 136 U/L Final    ALT (SGPT) 05/12/2023 17  7 - 45 U/L Final     Patients treated with Sulfasalazine may generate    falsely decreased results for ALT.    AST 05/12/2023 21  9 - 39 U/L Final    Total Protein 05/12/2023 6.6  6.4 - 8.2 g/dL Final         REVIEW OF SYSTEMS:        Objective      Vitals:    03/06/24 0724   BP: 117/60   BP Location: Left arm   Patient Position: Sitting   BP Cuff Size: Large adult   Pulse: 61   Temp: 36.4 °C (97.5 °F)   TempSrc: Temporal   SpO2: 97%   Weight: 103 kg (227 lb)         PHYSICAL:  Walking with a cane    Getting fitted for brace- has drop foot - seeing specialist     Patient is alert and oriented x 3 , NAD  HEAD- normocephalic and atraumatic   EYES-conjunctiva-normal, lids - normal  EARS/NOSE- normal external exam   NECK-supple,FROM  CV- RRR without murmur  PULM- CTA bilaterally, normal respiratory effort  RESPIRATORY EFFORT- normal , no retractions or nasal flaring   ABD- normoactive BS's   EXT- no edema,NT  SKIN- no abnormal skin lesions noted  NEURO- no focal deficits  PSYCH- pleasant, normal judgement and insight      BP Readings from Last 3 Encounters:   03/06/24 117/60   09/05/23 122/73   05/30/23 130/78             Wt Readings from Last 3 Encounters:   03/06/24 103 kg (227 lb)   09/05/23 112 kg (246 lb 4.8 oz)   05/30/23 111 kg (245 lb 4.8 oz)           BMI Readings from Last 3 Encounters:   03/06/24 36.64 kg/m²   09/05/23 39.75 kg/m²   05/30/23 39.59 kg/m²               The number and complexity of problems addressed is considered moderate.  The amount and/or complexity of data reviewed and analyzed is considered moderate. The risk of  complications and/or morbidity/mortality of patient is considered moderate. Overall, this patient encounter is considered a moderate risk visit.    Common Side Effects- ACEI    Cough  Dizziness  Feeling tired  Headache  Problems sleeping  Fast heart beat    Call your doctor if you have any of these signs:  Chest pain  Problems breathing or swallowing  Swelling in the face, eyes, lips, tongue, or legs  Patient's BMI is elevated.  Plan- diet and exercise- BMI is elevated. Need to increase activity on a daily basis especially walking.  Monitor  total calories per day- decrease carbohydrates and fats. Goal - lose 1-2 pounds per week.    Recommend 150 minutes of moderate-intensity exercise as tolerated per week and 2-3 days of resistance, flexibility, and neuromotor exercises per week.    Normal BMI- 18.5-25    Overweight=  BMI 26-29    Obese= BMI 30-39    Morbidly Obese = BMI >40          Assessment/Plan      Problem List Items Addressed This Visit          Active Problems    Allergic rhinitis - Primary (Chronic)    Anxiety, mild (Chronic)    Depression (Chronic)    Fibromyalgia (Chronic)    Relevant Medications    DULoxetine (Cymbalta) 20 mg DR capsule    Hypercholesteremia (Chronic)    Relevant Medications    atorvastatin (Lipitor) 10 mg tablet    Other Relevant Orders    Follow Up In [facility] - PCP - Established    VERO (obstructive sleep apnea) (Chronic)    Prediabetes    Relevant Orders    Hemoglobin A1C    Primary hypertension (Chronic)    Relevant Medications    lisinopril 10 mg tablet    Other Relevant Orders    Basic Metabolic Panel    Primary osteoarthritis of both knees     Other Visit Diagnoses       Hypercholesterolemia        Relevant Orders    Hepatic Function Panel    Lipid Panel              Continue medications             Follow up in end of June- insurance will be running out

## 2024-03-06 ENCOUNTER — OFFICE VISIT (OUTPATIENT)
Dept: PRIMARY CARE | Facility: CLINIC | Age: 64
End: 2024-03-06
Payer: COMMERCIAL

## 2024-03-06 ENCOUNTER — LAB (OUTPATIENT)
Dept: LAB | Facility: LAB | Age: 64
End: 2024-03-06
Payer: COMMERCIAL

## 2024-03-06 VITALS
OXYGEN SATURATION: 97 % | SYSTOLIC BLOOD PRESSURE: 117 MMHG | DIASTOLIC BLOOD PRESSURE: 60 MMHG | WEIGHT: 227 LBS | BODY MASS INDEX: 36.64 KG/M2 | HEART RATE: 61 BPM | TEMPERATURE: 97.5 F

## 2024-03-06 DIAGNOSIS — M17.0 PRIMARY OSTEOARTHRITIS OF BOTH KNEES: Chronic | ICD-10-CM

## 2024-03-06 DIAGNOSIS — I10 PRIMARY HYPERTENSION: Chronic | ICD-10-CM

## 2024-03-06 DIAGNOSIS — E78.00 HYPERCHOLESTEREMIA: Chronic | ICD-10-CM

## 2024-03-06 DIAGNOSIS — M79.7 FIBROMYALGIA: Chronic | ICD-10-CM

## 2024-03-06 DIAGNOSIS — R73.03 PREDIABETES: Chronic | ICD-10-CM

## 2024-03-06 DIAGNOSIS — F41.9 ANXIETY, MILD: Chronic | ICD-10-CM

## 2024-03-06 DIAGNOSIS — G47.33 OSA (OBSTRUCTIVE SLEEP APNEA): Chronic | ICD-10-CM

## 2024-03-06 DIAGNOSIS — E78.00 HYPERCHOLESTEROLEMIA: ICD-10-CM

## 2024-03-06 DIAGNOSIS — J30.9 ALLERGIC RHINITIS, UNSPECIFIED SEASONALITY, UNSPECIFIED TRIGGER: Primary | Chronic | ICD-10-CM

## 2024-03-06 DIAGNOSIS — F32.A DEPRESSION, UNSPECIFIED DEPRESSION TYPE: Chronic | ICD-10-CM

## 2024-03-06 PROCEDURE — 3008F BODY MASS INDEX DOCD: CPT | Performed by: FAMILY MEDICINE

## 2024-03-06 PROCEDURE — 3074F SYST BP LT 130 MM HG: CPT | Performed by: FAMILY MEDICINE

## 2024-03-06 PROCEDURE — 1036F TOBACCO NON-USER: CPT | Performed by: FAMILY MEDICINE

## 2024-03-06 PROCEDURE — 36415 COLL VENOUS BLD VENIPUNCTURE: CPT

## 2024-03-06 PROCEDURE — 82248 BILIRUBIN DIRECT: CPT

## 2024-03-06 PROCEDURE — 3078F DIAST BP <80 MM HG: CPT | Performed by: FAMILY MEDICINE

## 2024-03-06 PROCEDURE — 83036 HEMOGLOBIN GLYCOSYLATED A1C: CPT

## 2024-03-06 PROCEDURE — 80053 COMPREHEN METABOLIC PANEL: CPT

## 2024-03-06 PROCEDURE — 80061 LIPID PANEL: CPT

## 2024-03-06 PROCEDURE — 99214 OFFICE O/P EST MOD 30 MIN: CPT | Performed by: FAMILY MEDICINE

## 2024-03-06 RX ORDER — LISINOPRIL 10 MG/1
10 TABLET ORAL DAILY
Qty: 90 TABLET | Refills: 1 | Status: SHIPPED | OUTPATIENT
Start: 2024-03-06

## 2024-03-06 RX ORDER — ATORVASTATIN CALCIUM 10 MG/1
10 TABLET, FILM COATED ORAL NIGHTLY
Qty: 90 TABLET | Refills: 1 | Status: SHIPPED | OUTPATIENT
Start: 2024-03-06

## 2024-03-06 RX ORDER — DULOXETIN HYDROCHLORIDE 20 MG/1
20 CAPSULE, DELAYED RELEASE ORAL
Qty: 90 CAPSULE | Refills: 1 | Status: SHIPPED | OUTPATIENT
Start: 2024-03-06

## 2024-03-06 ASSESSMENT — PATIENT HEALTH QUESTIONNAIRE - PHQ9
8. MOVING OR SPEAKING SO SLOWLY THAT OTHER PEOPLE COULD HAVE NOTICED. OR THE OPPOSITE, BEING SO FIGETY OR RESTLESS THAT YOU HAVE BEEN MOVING AROUND A LOT MORE THAN USUAL: NOT AT ALL
1. LITTLE INTEREST OR PLEASURE IN DOING THINGS: NOT AT ALL
6. FEELING BAD ABOUT YOURSELF - OR THAT YOU ARE A FAILURE OR HAVE LET YOURSELF OR YOUR FAMILY DOWN: NOT AT ALL
9. THOUGHTS THAT YOU WOULD BE BETTER OFF DEAD, OR OF HURTING YOURSELF: NOT AT ALL
3. TROUBLE FALLING OR STAYING ASLEEP OR SLEEPING TOO MUCH: SEVERAL DAYS
10. IF YOU CHECKED OFF ANY PROBLEMS, HOW DIFFICULT HAVE THESE PROBLEMS MADE IT FOR YOU TO DO YOUR WORK, TAKE CARE OF THINGS AT HOME, OR GET ALONG WITH OTHER PEOPLE: NOT DIFFICULT AT ALL
4. FEELING TIRED OR HAVING LITTLE ENERGY: SEVERAL DAYS
7. TROUBLE CONCENTRATING ON THINGS, SUCH AS READING THE NEWSPAPER OR WATCHING TELEVISION: NOT AT ALL
2. FEELING DOWN, DEPRESSED OR HOPELESS: NOT AT ALL
5. POOR APPETITE OR OVEREATING: NOT AT ALL
SUM OF ALL RESPONSES TO PHQ9 QUESTIONS 1 AND 2: 0
SUM OF ALL RESPONSES TO PHQ QUESTIONS 1-9: 2

## 2024-03-06 ASSESSMENT — ANXIETY QUESTIONNAIRES
2. NOT BEING ABLE TO STOP OR CONTROL WORRYING: SEVERAL DAYS
5. BEING SO RESTLESS THAT IT IS HARD TO SIT STILL: NOT AT ALL
3. WORRYING TOO MUCH ABOUT DIFFERENT THINGS: SEVERAL DAYS
6. BECOMING EASILY ANNOYED OR IRRITABLE: NOT AT ALL
IF YOU CHECKED OFF ANY PROBLEMS ON THIS QUESTIONNAIRE, HOW DIFFICULT HAVE THESE PROBLEMS MADE IT FOR YOU TO DO YOUR WORK, TAKE CARE OF THINGS AT HOME, OR GET ALONG WITH OTHER PEOPLE: NOT DIFFICULT AT ALL
7. FEELING AFRAID AS IF SOMETHING AWFUL MIGHT HAPPEN: SEVERAL DAYS
1. FEELING NERVOUS, ANXIOUS, OR ON EDGE: SEVERAL DAYS
4. TROUBLE RELAXING: NOT AT ALL
GAD7 TOTAL SCORE: 4

## 2024-03-07 LAB
ALBUMIN SERPL BCP-MCNC: 3.8 G/DL (ref 3.4–5)
ALP SERPL-CCNC: 70 U/L (ref 33–136)
ALT SERPL W P-5'-P-CCNC: 13 U/L (ref 7–45)
ANION GAP SERPL CALC-SCNC: 13 MMOL/L (ref 10–20)
AST SERPL W P-5'-P-CCNC: 18 U/L (ref 9–39)
BILIRUB DIRECT SERPL-MCNC: 0.2 MG/DL (ref 0–0.3)
BILIRUB SERPL-MCNC: 1.1 MG/DL (ref 0–1.2)
BUN SERPL-MCNC: 14 MG/DL (ref 6–23)
CALCIUM SERPL-MCNC: 9.7 MG/DL (ref 8.6–10.6)
CHLORIDE SERPL-SCNC: 105 MMOL/L (ref 98–107)
CHOLEST SERPL-MCNC: 149 MG/DL (ref 0–199)
CHOLESTEROL/HDL RATIO: 2.5
CO2 SERPL-SCNC: 30 MMOL/L (ref 21–32)
CREAT SERPL-MCNC: 0.81 MG/DL (ref 0.5–1.05)
EGFRCR SERPLBLD CKD-EPI 2021: 82 ML/MIN/1.73M*2
EST. AVERAGE GLUCOSE BLD GHB EST-MCNC: 111 MG/DL
GLUCOSE SERPL-MCNC: 81 MG/DL (ref 74–99)
HBA1C MFR BLD: 5.5 %
HDLC SERPL-MCNC: 60.8 MG/DL
LDLC SERPL CALC-MCNC: 76 MG/DL
NON HDL CHOLESTEROL: 88 MG/DL (ref 0–149)
POTASSIUM SERPL-SCNC: 4.6 MMOL/L (ref 3.5–5.3)
PROT SERPL-MCNC: 6.2 G/DL (ref 6.4–8.2)
SODIUM SERPL-SCNC: 143 MMOL/L (ref 136–145)
TRIGL SERPL-MCNC: 61 MG/DL (ref 0–149)
VLDL: 12 MG/DL (ref 0–40)

## 2024-06-04 PROBLEM — Z12.11 SCREEN FOR COLON CANCER: Chronic | Status: ACTIVE | Noted: 2024-06-04

## 2024-06-04 PROBLEM — E66.9 CLASS 2 OBESITY WITH BODY MASS INDEX (BMI) OF 39.0 TO 39.9 IN ADULT: Chronic | Status: ACTIVE | Noted: 2024-06-04

## 2024-06-04 PROBLEM — Z12.11 SCREEN FOR COLON CANCER: Status: ACTIVE | Noted: 2024-06-04

## 2024-06-04 PROBLEM — E66.812 CLASS 2 OBESITY WITH BODY MASS INDEX (BMI) OF 39.0 TO 39.9 IN ADULT: Chronic | Status: ACTIVE | Noted: 2024-06-04

## 2024-06-04 NOTE — PROGRESS NOTES
follSubjective        Verito Plasencia. Is 63 y.o.. female. Here for follow up office visit-       Chief Complaint   Patient presents with    Follow-up    Cough     X 2.5 weeks   Pt denies any additional sxs       HPI:        Follow up for BP, VERO- does not use CPAP , prediabetes, cholesterol , depression         Pt is doing well      Had Shingrix               Pt had labs 3/2024      Other medical specialists are involved in patient's care.    Any recent notes that were available were reviewed.    All conditions are monitored, evaluated and assessed regularly.    Patient is compliant with current treatment regimen/medications.    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    AST 03/06/2024 18  9 - 39 U/L Final    Total Protein 03/06/2024 6.2 (L)  6.4 - 8.2 g/dL Final    Cholesterol 03/06/2024 149  0 - 199 mg/dL Final          Age      Desirable   Borderline High   High     0-19 Y     0 - 169       170 - 199     >/= 200    20-24 Y     0 - 189       190 - 224     >/= 225         >24 Y     0 - 199       200 - 239     >/= 240   **All ranges are based on fasting samples. Specific   therapeutic targets will vary based on  patient-specific   cardiac risk.    Pediatric guidelines reference:Pediatrics 2011, 128(S5).Adult guidelines reference: NCEP ATPIII Guidelines,KENROY 2001, 258:2486-97    Venipuncture immediately after or during the administration of Metamizole may lead to falsely low results. Testing should be performed immediately prior to Metamizole dosing.    HDL-Cholesterol 03/06/2024 60.8  mg/dL Final      Age       Very Low   Low     Normal    High    0-19 Y    < 35      < 40     40-45     ----  20-24 Y    ----     < 40      >45      ----        >24 Y      ----     < 40     40-60      >60      Cholesterol/HDL Ratio 03/06/2024 2.5   Final      Ref Values  Desirable  < 3.4  High Risk  > 5.0    LDL Calculated 03/06/2024 76  <=99 mg/dL Final                                Near   Borderline      AGE      Desirable  Optimal    High     High     Very High     0-19 Y     0 - 109     ---    110-129   >/= 130     ----    20-24 Y     0 - 119     ---    120-159   >/= 160     ----      >24 Y     0 -  99   100-129  130-159   160-189     >/=190      VLDL 03/06/2024 12  0 - 40 mg/dL Final    Triglycerides 03/06/2024 61  0 - 149 mg/dL Final       Age         Desirable   Borderline High   High     Very High   0 D-90 D    19 - 174         ----         ----        ----  91 D- 9 Y     0 -  74        75 -  99     >/= 100      ----    10-19 Y     0 -  89        90 - 129     >/= 130      ----    20-24 Y     0 - 114       115 - 149     >/= 150      ----         >24 Y     0 - 149       150 - 199    200- 499    >/= 500    Venipuncture immediately after or during the administration of Metamizole may lead to falsely low results. Testing should be performed immediately prior to Metamizole dosing.    Non HDL Cholesterol 03/06/2024 88  0 - 149 mg/dL Final          Age       Desirable   Borderline High   High     Very High     0-19 Y     0 - 119       120 - 144     >/= 145    >/= 160    20-24 Y     0 - 149       150 - 189     >/= 190      ----         >24 Y     30 mg/dL above LDL Cholesterol goal      Glucose 03/06/2024 81  74 - 99 mg/dL Final    Sodium 03/06/2024 143  136 - 145 mmol/L Final    Potassium 03/06/2024 4.6  3.5 - 5.3 mmol/L Final    Chloride 03/06/2024 105  98 - 107 mmol/L Final    Bicarbonate 03/06/2024 30  21 - 32 mmol/L Final    Anion Gap 03/06/2024 13  10 - 20 mmol/L Final    Urea Nitrogen 03/06/2024 14  6 - 23 mg/dL Final    Creatinine 03/06/2024 0.81  0.50 - 1.05 mg/dL Final    eGFR 03/06/2024 82  >60 mL/min/1.73m*2 Final    Calculations of estimated GFR are performed using the 2021 CKD-EPI Study Refit equation without the race variable for the IDMS-Traceable creatinine methods.  https://jasn.asnjournals.org/content/early/2021/09/22/ASN.1551374343    Calcium 03/06/2024 9.7  8.6 - 10.6 mg/dL Final    Hemoglobin A1C 03/06/2024 5.5  see below % Final    Estimated Average Glucose 03/06/2024 111  Not Established mg/dL Final         REVIEW OF SYSTEMS:        Objective      Vitals:    06/06/24 0901   BP: 122/74   BP Location: Left arm   Patient Position: Sitting   BP Cuff Size: Large adult   Pulse: 70   Resp: 14   Temp: 36.5 °C (97.7 °F)   SpO2: 97%   Weight: 102 kg (224 lb 3.2 oz)         PHYSICAL:  Pt is A and O x3, NAD, Vital signs are within normal limits  General Appearance- normal , good hygiene,talks easily  EYES- conjunctiva and lids- normal  EARS/NOSE-TM's normal, head normocephalic and atraumatic, nasopharynx-green nasal discharge, no trismus, no hot potato voice  OROPHARYNX- normal  NECK- supple, FROM  LYMPH- no cervical lymph nodes palpated   CV- RRR without murmur  PULM- CTA bilaterally  Respiratory effort- normal respiratory effort , no retractions or nasal flaring   ABDOMEN- normoactive BS's   EXTREMITIES-no edema,NT  SKIN- no abnormal skin lesions on inspection or palpation   NEURO- no focal deficits  PSYCH- pleasant, normal judgement and insight        BP Readings from Last 3 Encounters:   06/06/24 122/74   03/06/24 117/60   09/05/23 122/73              Wt Readings from Last 3 Encounters:   06/06/24 102 kg (224 lb 3.2 oz)   03/06/24 103 kg (227 lb)   09/05/23 112 kg (246 lb 4.8 oz)           BMI Readings from Last 3 Encounters:   06/06/24 36.19 kg/m²   03/06/24 36.64 kg/m²   09/05/23 39.75 kg/m²               The number and complexity of problems addressed is considered moderate.  The amount and/or complexity of data reviewed and analyzed is considered moderate. The risk of complications and/or morbidity/mortality of patient is considered moderate. Overall, this patient encounter is considered a moderate risk visit.    Common Side Effects- ACEI    Cough  Dizziness  Feeling tired  Headache  Problems sleeping  Fast heart beat    Call your doctor if you have any of these signs:  Chest pain  Problems breathing or swallowing  Swelling in the face, eyes, lips, tongue, or legs  Patient's BMI is elevated.  Plan- diet and exercise- BMI is elevated. Need to increase activity on a daily basis especially walking.  Monitor  total calories per day- decrease carbohydrates and fats. Goal - lose 1-2 pounds per week.    Recommend 150 minutes of moderate-intensity exercise as tolerated per week and 2-3 days of resistance, flexibility, and neuromotor exercises per week.    Normal BMI- 18.5-25    Overweight=  BMI 26-29    Obese= BMI 30-39    Morbidly Obese = BMI >40    What are antibiotics?  Antibiotics are medicines that help people fight infections caused by bacteria. They work by killing bacteria that are in the body. These medicines come in many different forms, including pills, ointments, and liquids that are given by injection.    Antibiotics can do a lot of good. For people with serious bacterial infections, antibiotics can save lives. But people use them far too often, even when they're not needed. This is causing a very serious problem called antibiotic resistance. Antibiotic resistance happens when bacteria that have been exposed to an antibiotic change so that  the antibiotic can no longer kill them. In those bacteria, the antibiotic has no effect. Because of this problem, doctors are having a harder and harder time treating infections. Experts worry that there will soon be infections that don't respond to any antibiotics.    When are antibiotics helpful?  Antibiotics can help people fight off infections caused by bacteria. They do not work on infections caused by viruses.    Some common bacterial infections that are treated with antibiotics include:    Strep throat    Pneumonia (an infection of the lungs)    Bladder infections    Infections you catch through sex, such as gonorrhea and chlamydia    When are antibiotics NOT helpful?    Antibiotics do not work on infections caused by viruses.    Antibiotics are not helpful for the common cold, because the common cold is caused by a virus.    Antibiotics are not helpful for the flu, because the flu is caused by a virus. (People with the flu can be treated with medicines called antiviral medicines, which are different from antibiotics.)      Even though antibiotics don't work on infections caused by viruses, people sometimes believe that they do. That's because they took antibiotics for a viral infection before and then got better. The problem is that those people would have gotten better with or without an antibiotic. When they get better with the antibiotic, they think that's what cured them, when in reality the antibiotic had nothing to do with it.    What's the harm in taking antibiotics even if they might not help?  There are many reasons you should not take antibiotics unless you absolutely need them:    Antibiotics cause side effects, such as nausea, vomiting, and diarrhea. They can even make it more likely that you will get a different kind of infection, such as yeast infection (if you are a woman).    Allergies to antibiotics are common. You can develop an allergy to an antibiotic, even if you have not had a problem  "with it before. Some allergies are just unpleasant, causing rashes and itching. But some can be very serious and even life-threatening. It is better to avoid any risk of an allergy, if the antibiotic wouldn't help you anyway.    Overuse of antibiotics leads to antibiotic resistance. Using antibiotics when they are not needed gives bacteria a chance to change, so that the antibiotics cannot hurt them later on. People who have infections caused by antibiotic-resistant bacteria often have to be treated in the hospital with many different antibiotics. People can even die from these infections, because there is no antibiotic that will cure them.    When should I take antibiotics?  You should take antibiotics only when a doctor or nurse prescribes them to you. You should never take antibiotics prescribed to someone else, and you should not take antibiotics that were prescribed to you for a previous illness. When prescribing an antibiotic, doctors and nurses have to carefully pick the right antibiotic for a particular infection. Not all antibiotics work on all bacteria.    If you do have an infection caused by a bacteria, your doctor or nurse might want to find out what that bacteria is, and which antibiotics can kill it. They do this by taking a \"culture\" of the bacteria and growing it in the lab. But it's not possible to do a culture on someone who has already started an antibiotic. So if you start an antibiotic without input from a doctor or nurse it will be impossible to know if you have taken the right one.    What can I do to reduce antibiotic resistance?  Here are some things that can help:    Do not pressure your doctor or nurse for antibiotics when they do not think you need them.    If you are prescribed antibiotics, finish all of the medicine and take it exactly as directed. Never skip doses or stop taking the medicine without talking to your doctor or nurse.    Do not give antibiotics that were prescribed to " "you to anyone else.    What if my doctor prescribes an antibiotic that did not work for me before?  If an antibiotic did not work for you before, that does not mean it will never work for you. If you have used an antibiotic before and it did not work, tell your doctor. But keep in mind that the infection you had before might not have been caused by the same bacteria that you have now. The \"best\" antibiotic is the right one for the bacteria causing the infection, not for the person with the infection.    What if I am allergic to an antibiotic?  If you had a bad reaction to an antibiotic, tell your doctor or nurse about it. But do not assume you are allergic unless your doctor or nurse tells you that you are.    Many people who think they are allergic to an antibiotic are wrong. If you get nauseous after taking an antibiotic, that does not mean you are allergic to it. Nausea is a common side effect of many antibiotics. If you are a woman and you get a yeast infection after taking an antibiotic, that does not mean you are allergic to it. Yeast infections are a common side effect of antibiotics.    Symptoms of antibiotic allergy can be mild and include a flat rash and itching. They can also be more serious and include:    Hives - Hives are raised, red patches of skin that are usually very itchy (picture 1).    Lip or tongue swelling    Trouble swallowing or breathing    Serious allergy symptoms can start right after you start taking an antibiotic if you are very sensitive. Less serious symptoms, on the other hand, often start a day or more later.    Almost all antibiotics may cause possible side effects of allergic reaction, nausea, vomiting, diarrhea- most worrisome caused by C. diff, and secondary yeast infection.        Assessment/Plan      Problem List Items Addressed This Visit          Active Problems    Allergic rhinitis (Chronic)    Anxiety, mild (Chronic)    Class 2 obesity with body mass index (BMI) of 39.0 to " 39.9 in adult (Chronic)    Depression (Chronic)    Fibromyalgia (Chronic)    Hypercholesteremia - Primary (Chronic)    VERO (obstructive sleep apnea) (Chronic)    Prediabetes    Primary hypertension (Chronic)     Other Visit Diagnoses       Cough, unspecified type        Relevant Medications    amoxicillin (Amoxil) 500 mg capsule            Continue medications             Start amoxicillin      Follow up in January

## 2024-06-06 ENCOUNTER — OFFICE VISIT (OUTPATIENT)
Dept: PRIMARY CARE | Facility: CLINIC | Age: 64
End: 2024-06-06
Payer: COMMERCIAL

## 2024-06-06 VITALS
DIASTOLIC BLOOD PRESSURE: 74 MMHG | WEIGHT: 224.2 LBS | BODY MASS INDEX: 36.19 KG/M2 | TEMPERATURE: 97.7 F | RESPIRATION RATE: 14 BRPM | OXYGEN SATURATION: 97 % | HEART RATE: 70 BPM | SYSTOLIC BLOOD PRESSURE: 122 MMHG

## 2024-06-06 DIAGNOSIS — J30.9 ALLERGIC RHINITIS, UNSPECIFIED SEASONALITY, UNSPECIFIED TRIGGER: Chronic | ICD-10-CM

## 2024-06-06 DIAGNOSIS — E78.00 HYPERCHOLESTEREMIA: Primary | Chronic | ICD-10-CM

## 2024-06-06 DIAGNOSIS — R05.9 COUGH, UNSPECIFIED TYPE: ICD-10-CM

## 2024-06-06 DIAGNOSIS — G47.33 OSA (OBSTRUCTIVE SLEEP APNEA): Chronic | ICD-10-CM

## 2024-06-06 DIAGNOSIS — F32.A DEPRESSION, UNSPECIFIED DEPRESSION TYPE: Chronic | ICD-10-CM

## 2024-06-06 DIAGNOSIS — F41.9 ANXIETY, MILD: Chronic | ICD-10-CM

## 2024-06-06 DIAGNOSIS — I10 PRIMARY HYPERTENSION: Chronic | ICD-10-CM

## 2024-06-06 DIAGNOSIS — M79.7 FIBROMYALGIA: Chronic | ICD-10-CM

## 2024-06-06 DIAGNOSIS — E66.9 CLASS 2 OBESITY WITH BODY MASS INDEX (BMI) OF 39.0 TO 39.9 IN ADULT, UNSPECIFIED OBESITY TYPE, UNSPECIFIED WHETHER SERIOUS COMORBIDITY PRESENT: Chronic | ICD-10-CM

## 2024-06-06 DIAGNOSIS — R73.03 PREDIABETES: Chronic | ICD-10-CM

## 2024-06-06 PROCEDURE — 1036F TOBACCO NON-USER: CPT | Performed by: FAMILY MEDICINE

## 2024-06-06 PROCEDURE — 3074F SYST BP LT 130 MM HG: CPT | Performed by: FAMILY MEDICINE

## 2024-06-06 PROCEDURE — 99214 OFFICE O/P EST MOD 30 MIN: CPT | Performed by: FAMILY MEDICINE

## 2024-06-06 PROCEDURE — 3078F DIAST BP <80 MM HG: CPT | Performed by: FAMILY MEDICINE

## 2024-06-06 RX ORDER — AMOXICILLIN 500 MG/1
CAPSULE ORAL
Qty: 40 CAPSULE | Refills: 0 | Status: SHIPPED | OUTPATIENT
Start: 2024-06-06

## 2024-06-06 ASSESSMENT — PATIENT HEALTH QUESTIONNAIRE - PHQ9
1. LITTLE INTEREST OR PLEASURE IN DOING THINGS: NOT AT ALL
SUM OF ALL RESPONSES TO PHQ9 QUESTIONS 1 AND 2: 0
2. FEELING DOWN, DEPRESSED OR HOPELESS: NOT AT ALL

## 2024-09-11 DIAGNOSIS — I10 PRIMARY HYPERTENSION: Chronic | ICD-10-CM

## 2024-09-11 RX ORDER — LISINOPRIL 10 MG/1
10 TABLET ORAL DAILY
Qty: 90 TABLET | Refills: 3 | Status: SHIPPED | OUTPATIENT
Start: 2024-09-11

## 2024-12-16 PROBLEM — R73.03 PREDIABETES: Chronic | Status: ACTIVE | Noted: 2023-05-10

## 2024-12-16 PROBLEM — E78.00 HYPERCHOLESTEREMIA: Chronic | Status: RESOLVED | Noted: 2023-05-09 | Resolved: 2024-12-16

## 2024-12-16 PROBLEM — E87.1 HYPONATREMIA: Status: RESOLVED | Noted: 2023-05-10 | Resolved: 2024-12-16

## 2024-12-16 PROBLEM — E78.00 HYPERCHOLESTEROLEMIA: Chronic | Status: ACTIVE | Noted: 2024-12-16

## 2024-12-16 NOTE — PROGRESS NOTES
follSubjective        Verito Plasencia. Is 64 y.o.. female. Here for follow up office visit-       Chief Complaint   Patient presents with    Follow-up    Hyperlipidemia    Prediabetes       HPI:        Follow up for BP, VERO- does not use CPAP , prediabetes, cholesterol , depression , fibromyalgiao          Scales reviewed- none today - but per pt she is doing very well - off cymbalta for 3 weeks         Patient Health Questionnaire-2 Score: 0 (01/07/25 0827) was On cymbalta      Pt is doing well      Had Shingrix         Discussed increase in weight          Other medical specialists are involved in patient's care.    Any recent notes that were available were reviewed.    All conditions are monitored, evaluated and assessed regularly.    Patient is compliant with current treatment regimen/medications.    Specialists involved include:      Dr Colón- - peroneal tendonitis- right- 11/9/2023  Left sural nerve damage     GYN- Dr Candelaria      Pt had  EGD-  Dr Altman - diagnosis esophagitis and hiatal hernia   9/2021 colonoscopy     Dr Ham         Pt is retired now - since 2019            Labs reported in this progress note have been reviewed at or prior to this office visit.      Lab on 03/06/2024   Component Date Value Ref Range Status    Albumin 03/06/2024 3.8  3.4 - 5.0 g/dL Final    Bilirubin, Total 03/06/2024 1.1  0.0 - 1.2 mg/dL Final    Bilirubin, Direct 03/06/2024 0.2  0.0 - 0.3 mg/dL Final    Alkaline Phosphatase 03/06/2024 70  33 - 136 U/L Final    ALT 03/06/2024 13  7 - 45 U/L Final    Patients treated with Sulfasalazine may generate falsely decreased results for ALT.    AST 03/06/2024 18  9 - 39 U/L Final    Total Protein 03/06/2024 6.2 (L)  6.4 - 8.2 g/dL Final    Cholesterol 03/06/2024 149  0 - 199 mg/dL Final          Age      Desirable   Borderline High   High     0-19 Y     0 - 169       170 - 199     >/= 200    20-24 Y     0 - 189       190 - 224     >/= 225         >24 Y     0 - 199       200 -  239     >/= 240   **All ranges are based on fasting samples. Specific   therapeutic targets will vary based on patient-specific   cardiac risk.    Pediatric guidelines reference:Pediatrics 2011, 128(S5).Adult guidelines reference: NCEP ATPIII Guidelines,KENROY 2001, 258:2486-97    Venipuncture immediately after or during the administration of Metamizole may lead to falsely low results. Testing should be performed immediately prior to Metamizole dosing.    HDL-Cholesterol 03/06/2024 60.8  mg/dL Final      Age       Very Low   Low     Normal    High    0-19 Y    < 35      < 40     40-45     ----  20-24 Y    ----     < 40      >45      ----        >24 Y      ----     < 40     40-60      >60      Cholesterol/HDL Ratio 03/06/2024 2.5   Final      Ref Values  Desirable  < 3.4  High Risk  > 5.0    LDL Calculated 03/06/2024 76  <=99 mg/dL Final                                Near   Borderline      AGE      Desirable  Optimal    High     High     Very High     0-19 Y     0 - 109     ---    110-129   >/= 130     ----    20-24 Y     0 - 119     ---    120-159   >/= 160     ----      >24 Y     0 -  99   100-129  130-159   160-189     >/=190      VLDL 03/06/2024 12  0 - 40 mg/dL Final    Triglycerides 03/06/2024 61  0 - 149 mg/dL Final       Age         Desirable   Borderline High   High     Very High   0 D-90 D    19 - 174         ----         ----        ----  91 D- 9 Y     0 -  74        75 -  99     >/= 100      ----    10-19 Y     0 -  89        90 - 129     >/= 130      ----    20-24 Y     0 - 114       115 - 149     >/= 150      ----         >24 Y     0 - 149       150 - 199    200- 499    >/= 500    Venipuncture immediately after or during the administration of Metamizole may lead to falsely low results. Testing should be performed immediately prior to Metamizole dosing.    Non HDL Cholesterol 03/06/2024 88  0 - 149 mg/dL Final          Age       Desirable   Borderline High   High     Very High     0-19 Y     0 - 119        "120 - 144     >/= 145    >/= 160    20-24 Y     0 - 149       150 - 189     >/= 190      ----         >24 Y    30 mg/dL above LDL Cholesterol goal      Glucose 03/06/2024 81  74 - 99 mg/dL Final    Sodium 03/06/2024 143  136 - 145 mmol/L Final    Potassium 03/06/2024 4.6  3.5 - 5.3 mmol/L Final    Chloride 03/06/2024 105  98 - 107 mmol/L Final    Bicarbonate 03/06/2024 30  21 - 32 mmol/L Final    Anion Gap 03/06/2024 13  10 - 20 mmol/L Final    Urea Nitrogen 03/06/2024 14  6 - 23 mg/dL Final    Creatinine 03/06/2024 0.81  0.50 - 1.05 mg/dL Final    eGFR 03/06/2024 82  >60 mL/min/1.73m*2 Final    Calculations of estimated GFR are performed using the 2021 CKD-EPI Study Refit equation without the race variable for the IDMS-Traceable creatinine methods.  https://jasn.asnjournals.org/content/early/2021/09/22/ASN.6786450232    Calcium 03/06/2024 9.7  8.6 - 10.6 mg/dL Final    Hemoglobin A1C 03/06/2024 5.5  see below % Final    Estimated Average Glucose 03/06/2024 111  Not Established mg/dL Final         REVIEW OF SYSTEMS:        Objective      Vitals:    01/07/25 0829   BP: 110/70   BP Location: Left arm   Patient Position: Sitting   BP Cuff Size: Large adult   Pulse: 72   Resp: 16   Temp: 36.5 °C (97.7 °F)   TempSrc: Temporal   SpO2: 98%   Weight: 109 kg (240 lb 11.2 oz)   Height: 1.676 m (5' 6\")           PHYSICAL:  Pt is A and O x3, NAD, Vital signs are within normal limits  General Appearance- normal , good hygiene,talks easily  EYES- conjunctiva and lids- normal  EARS/NOSE-TM's normal, head normocephalic and atraumatic, nasopharynx-green nasal discharge, no trismus, no hot potato voice  OROPHARYNX- normal  NECK- supple, FROM  LYMPH- no cervical lymph nodes palpated   CV- RRR without murmur  PULM- CTA bilaterally  Respiratory effort- normal respiratory effort , no retractions or nasal flaring   ABDOMEN- normoactive BS's   EXTREMITIES-no edema,NT  SKIN- no abnormal skin lesions on inspection or palpation   NEURO- no " focal deficits  PSYCH- pleasant, normal judgement and insight        BP Readings from Last 3 Encounters:   01/07/25 110/70   06/06/24 122/74   03/06/24 117/60             Wt Readings from Last 3 Encounters:   01/07/25 109 kg (240 lb 11.2 oz)   06/06/24 102 kg (224 lb 3.2 oz)   03/06/24 103 kg (227 lb)           BMI Readings from Last 3 Encounters:   01/07/25 38.85 kg/m²   06/06/24 36.19 kg/m²   03/06/24 36.64 kg/m²               The number and complexity of problems addressed is considered moderate.  The amount and/or complexity of data reviewed and analyzed is considered moderate. The risk of complications and/or morbidity/mortality of patient is considered moderate. Overall, this patient encounter is considered a moderate risk visit.    Common Side Effects- ACEI    Cough  Dizziness  Feeling tired  Headache  Problems sleeping  Fast heart beat    Call your doctor if you have any of these signs:  Chest pain  Problems breathing or swallowing  Swelling in the face, eyes, lips, tongue, or legs  Patient's BMI is elevated.  Plan- diet and exercise- BMI is elevated. Need to increase activity on a daily basis especially walking.  Monitor  total calories per day- decrease carbohydrates and fats. Goal - lose 1-2 pounds per week.        Assessment/Plan        Assessment & Plan  Hypercholesterolemia    Orders:    Hepatic Function Panel; Future    Lipid Panel; Future    Primary hypertension    Orders:    Basic Metabolic Panel; Future    lisinopril 10 mg tablet; Take 1 tablet (10 mg) by mouth once daily. for blood pressure    Prediabetes    Orders:    Hemoglobin A1C; Future    Fibromyalgia         Hypercholesteremia    Orders:    atorvastatin (Lipitor) 10 mg tablet; Take 1 tablet (10 mg) by mouth once daily at bedtime.    Class 2 obesity due to excess calories without serious comorbidity with body mass index (BMI) of 38.0 to 38.9 in adult  Patient's BMI is elevated.  Plan- diet and exercise- BMI is elevated. Need to increase  activity on a daily basis especially walking.  Monitor  total calories per day- decrease carbohydrates and fats. Goal - lose 1-2 pounds per week.    Recommend 150 minutes of moderate-intensity exercise as tolerated per week and 2-3 days of resistance, flexibility, and neuromotor exercises per week.    Normal BMI- 18.5-25    Overweight=  BMI 26-29    Obese= BMI 30-39    Morbidly Obese = BMI >40      Pt will restart [employer]       Walks at [facility]                  Continue medications                 Follow up 6 months - follow up

## 2024-12-16 NOTE — ASSESSMENT & PLAN NOTE
Orders:    Basic Metabolic Panel; Future    lisinopril 10 mg tablet; Take 1 tablet (10 mg) by mouth once daily. for blood pressure

## 2025-01-07 ENCOUNTER — APPOINTMENT (OUTPATIENT)
Dept: PRIMARY CARE | Facility: CLINIC | Age: 65
End: 2025-01-07
Payer: COMMERCIAL

## 2025-01-07 VITALS
HEIGHT: 66 IN | RESPIRATION RATE: 16 BRPM | TEMPERATURE: 97.7 F | BODY MASS INDEX: 38.68 KG/M2 | SYSTOLIC BLOOD PRESSURE: 110 MMHG | HEART RATE: 72 BPM | WEIGHT: 240.7 LBS | OXYGEN SATURATION: 98 % | DIASTOLIC BLOOD PRESSURE: 70 MMHG

## 2025-01-07 DIAGNOSIS — E78.00 HYPERCHOLESTEROLEMIA: Primary | Chronic | ICD-10-CM

## 2025-01-07 DIAGNOSIS — I10 PRIMARY HYPERTENSION: Chronic | ICD-10-CM

## 2025-01-07 DIAGNOSIS — E66.812 CLASS 2 OBESITY DUE TO EXCESS CALORIES WITHOUT SERIOUS COMORBIDITY WITH BODY MASS INDEX (BMI) OF 38.0 TO 38.9 IN ADULT: ICD-10-CM

## 2025-01-07 DIAGNOSIS — M79.7 FIBROMYALGIA: Chronic | ICD-10-CM

## 2025-01-07 DIAGNOSIS — E78.00 HYPERCHOLESTEREMIA: Chronic | ICD-10-CM

## 2025-01-07 DIAGNOSIS — R73.03 PREDIABETES: Chronic | ICD-10-CM

## 2025-01-07 DIAGNOSIS — E66.09 CLASS 2 OBESITY DUE TO EXCESS CALORIES WITHOUT SERIOUS COMORBIDITY WITH BODY MASS INDEX (BMI) OF 38.0 TO 38.9 IN ADULT: ICD-10-CM

## 2025-01-07 PROCEDURE — 99214 OFFICE O/P EST MOD 30 MIN: CPT | Performed by: FAMILY MEDICINE

## 2025-01-07 PROCEDURE — 1036F TOBACCO NON-USER: CPT | Performed by: FAMILY MEDICINE

## 2025-01-07 PROCEDURE — 3074F SYST BP LT 130 MM HG: CPT | Performed by: FAMILY MEDICINE

## 2025-01-07 PROCEDURE — 3008F BODY MASS INDEX DOCD: CPT | Performed by: FAMILY MEDICINE

## 2025-01-07 PROCEDURE — 3078F DIAST BP <80 MM HG: CPT | Performed by: FAMILY MEDICINE

## 2025-01-07 RX ORDER — LISINOPRIL 10 MG/1
10 TABLET ORAL DAILY
Qty: 90 TABLET | Refills: 3 | Status: SHIPPED | OUTPATIENT
Start: 2025-01-07

## 2025-01-07 RX ORDER — ATORVASTATIN CALCIUM 10 MG/1
10 TABLET, FILM COATED ORAL NIGHTLY
Qty: 90 TABLET | Refills: 3 | Status: SHIPPED | OUTPATIENT
Start: 2025-01-07

## 2025-01-07 ASSESSMENT — PATIENT HEALTH QUESTIONNAIRE - PHQ9
1. LITTLE INTEREST OR PLEASURE IN DOING THINGS: NOT AT ALL
2. FEELING DOWN, DEPRESSED OR HOPELESS: NOT AT ALL
SUM OF ALL RESPONSES TO PHQ9 QUESTIONS 1 AND 2: 0

## 2025-07-08 ENCOUNTER — APPOINTMENT (OUTPATIENT)
Dept: PRIMARY CARE | Facility: CLINIC | Age: 65
End: 2025-07-08
Payer: COMMERCIAL

## 2025-08-05 ENCOUNTER — PATIENT OUTREACH (OUTPATIENT)
Dept: CARE COORDINATION | Facility: CLINIC | Age: 65
End: 2025-08-05
Payer: COMMERCIAL

## 2025-08-05 DIAGNOSIS — Z12.31 ENCOUNTER FOR SCREENING MAMMOGRAM FOR BREAST CANCER: ICD-10-CM

## 2025-08-22 PROBLEM — K85.90 ACUTE PANCREATITIS: Status: RESOLVED | Noted: 2023-05-10 | Resolved: 2025-08-22

## 2025-08-22 PROBLEM — R25.1 TREMOR OF BOTH HANDS: Chronic | Status: ACTIVE | Noted: 2023-05-09

## 2025-08-22 PROBLEM — Z12.4 SCREENING FOR CERVICAL CANCER: Chronic | Status: ACTIVE | Noted: 2025-08-22

## 2025-08-26 ENCOUNTER — APPOINTMENT (OUTPATIENT)
Dept: PRIMARY CARE | Facility: CLINIC | Age: 65
End: 2025-08-26
Payer: MEDICARE

## 2025-08-26 VITALS
OXYGEN SATURATION: 98 % | TEMPERATURE: 97.5 F | HEART RATE: 70 BPM | WEIGHT: 230 LBS | HEIGHT: 66 IN | RESPIRATION RATE: 12 BRPM | BODY MASS INDEX: 36.96 KG/M2 | SYSTOLIC BLOOD PRESSURE: 126 MMHG | DIASTOLIC BLOOD PRESSURE: 82 MMHG

## 2025-08-26 DIAGNOSIS — Z78.0 POSTMENOPAUSAL: ICD-10-CM

## 2025-08-26 DIAGNOSIS — R73.03 PREDIABETES: Chronic | ICD-10-CM

## 2025-08-26 DIAGNOSIS — I10 PRIMARY HYPERTENSION: Chronic | ICD-10-CM

## 2025-08-26 DIAGNOSIS — G47.33 OSA (OBSTRUCTIVE SLEEP APNEA): Chronic | ICD-10-CM

## 2025-08-26 DIAGNOSIS — Z00.00 WELCOME TO MEDICARE PREVENTIVE VISIT: Primary | Chronic | ICD-10-CM

## 2025-08-26 DIAGNOSIS — E66.09 CLASS 2 OBESITY DUE TO EXCESS CALORIES WITHOUT SERIOUS COMORBIDITY WITH BODY MASS INDEX (BMI) OF 38.0 TO 38.9 IN ADULT: Chronic | ICD-10-CM

## 2025-08-26 DIAGNOSIS — E66.812 CLASS 2 OBESITY DUE TO EXCESS CALORIES WITHOUT SERIOUS COMORBIDITY WITH BODY MASS INDEX (BMI) OF 38.0 TO 38.9 IN ADULT: Chronic | ICD-10-CM

## 2025-08-26 DIAGNOSIS — E78.00 HYPERCHOLESTEROLEMIA: Chronic | ICD-10-CM

## 2025-08-26 DIAGNOSIS — Z12.31 ENCOUNTER FOR SCREENING MAMMOGRAM FOR MALIGNANT NEOPLASM OF BREAST: ICD-10-CM

## 2025-08-26 PROBLEM — E66.813 CLASS 3 SEVERE OBESITY IN ADULT: Status: RESOLVED | Noted: 2023-05-07 | Resolved: 2025-08-26

## 2025-08-26 PROBLEM — F33.1 MAJOR DEPRESSIVE DISORDER, RECURRENT EPISODE, MODERATE WITH ANXIOUS DISTRESS (MULTI): Chronic | Status: RESOLVED | Noted: 2023-05-09 | Resolved: 2025-08-26

## 2025-08-26 PROBLEM — F33.2 MODERATELY SEVERE RECURRENT MAJOR DEPRESSION (MULTI): Status: RESOLVED | Noted: 2023-05-09 | Resolved: 2025-08-26

## 2025-08-26 PROCEDURE — 1036F TOBACCO NON-USER: CPT | Performed by: FAMILY MEDICINE

## 2025-08-26 PROCEDURE — 3074F SYST BP LT 130 MM HG: CPT | Performed by: FAMILY MEDICINE

## 2025-08-26 PROCEDURE — 1160F RVW MEDS BY RX/DR IN RCRD: CPT | Performed by: FAMILY MEDICINE

## 2025-08-26 PROCEDURE — 3008F BODY MASS INDEX DOCD: CPT | Performed by: FAMILY MEDICINE

## 2025-08-26 PROCEDURE — 1123F ACP DISCUSS/DSCN MKR DOCD: CPT | Performed by: FAMILY MEDICINE

## 2025-08-26 PROCEDURE — 3079F DIAST BP 80-89 MM HG: CPT | Performed by: FAMILY MEDICINE

## 2025-08-26 PROCEDURE — 1159F MED LIST DOCD IN RCRD: CPT | Performed by: FAMILY MEDICINE

## 2025-08-26 PROCEDURE — G0403 EKG FOR INITIAL PREVENT EXAM: HCPCS | Performed by: FAMILY MEDICINE

## 2025-08-26 PROCEDURE — 1170F FXNL STATUS ASSESSED: CPT | Performed by: FAMILY MEDICINE

## 2025-08-26 PROCEDURE — G0402 INITIAL PREVENTIVE EXAM: HCPCS | Performed by: FAMILY MEDICINE

## 2025-08-26 ASSESSMENT — PATIENT HEALTH QUESTIONNAIRE - PHQ9
8. MOVING OR SPEAKING SO SLOWLY THAT OTHER PEOPLE COULD HAVE NOTICED. OR THE OPPOSITE, BEING SO FIGETY OR RESTLESS THAT YOU HAVE BEEN MOVING AROUND A LOT MORE THAN USUAL: NOT AT ALL
2. FEELING DOWN, DEPRESSED OR HOPELESS: SEVERAL DAYS
6. FEELING BAD ABOUT YOURSELF - OR THAT YOU ARE A FAILURE OR HAVE LET YOURSELF OR YOUR FAMILY DOWN: NOT AT ALL
SUM OF ALL RESPONSES TO PHQ9 QUESTIONS 1 AND 2: 2
1. LITTLE INTEREST OR PLEASURE IN DOING THINGS: SEVERAL DAYS
4. FEELING TIRED OR HAVING LITTLE ENERGY: MORE THAN HALF THE DAYS
5. POOR APPETITE OR OVEREATING: MORE THAN HALF THE DAYS
3. TROUBLE FALLING OR STAYING ASLEEP OR SLEEPING TOO MUCH: MORE THAN HALF THE DAYS
SUM OF ALL RESPONSES TO PHQ QUESTIONS 1-9: 8
7. TROUBLE CONCENTRATING ON THINGS, SUCH AS READING THE NEWSPAPER OR WATCHING TELEVISION: NOT AT ALL
9. THOUGHTS THAT YOU WOULD BE BETTER OFF DEAD, OR OF HURTING YOURSELF: NOT AT ALL

## 2025-08-26 ASSESSMENT — ENCOUNTER SYMPTOMS
OCCASIONAL FEELINGS OF UNSTEADINESS: 0
LOSS OF SENSATION IN FEET: 0
DEPRESSION: 0

## 2025-08-26 ASSESSMENT — ACTIVITIES OF DAILY LIVING (ADL)
GROCERY_SHOPPING: INDEPENDENT
MANAGING_FINANCES: INDEPENDENT
BATHING: INDEPENDENT
DOING_HOUSEWORK: INDEPENDENT
TAKING_MEDICATION: INDEPENDENT
DRESSING: INDEPENDENT

## 2025-08-26 ASSESSMENT — VISUAL ACUITY
OS_CC: 20/40
OD_CC: 20/40

## 2025-09-05 ENCOUNTER — HOSPITAL ENCOUNTER (OUTPATIENT)
Dept: RADIOLOGY | Facility: CLINIC | Age: 65
Discharge: HOME | End: 2025-09-05
Payer: MEDICARE

## 2025-09-05 VITALS — BODY MASS INDEX: 36.96 KG/M2 | WEIGHT: 230 LBS | HEIGHT: 66 IN

## 2025-09-05 DIAGNOSIS — Z78.0 POSTMENOPAUSAL: ICD-10-CM

## 2025-09-05 DIAGNOSIS — Z12.31 ENCOUNTER FOR SCREENING MAMMOGRAM FOR MALIGNANT NEOPLASM OF BREAST: ICD-10-CM

## 2025-09-05 PROBLEM — Z13.820 SCREENING FOR OSTEOPOROSIS: Chronic | Status: ACTIVE | Noted: 2025-09-05

## 2025-09-05 PROCEDURE — 77080 DXA BONE DENSITY AXIAL: CPT | Performed by: STUDENT IN AN ORGANIZED HEALTH CARE EDUCATION/TRAINING PROGRAM

## 2025-09-05 PROCEDURE — 77080 DXA BONE DENSITY AXIAL: CPT

## 2025-09-05 PROCEDURE — 77063 BREAST TOMOSYNTHESIS BI: CPT

## 2025-09-06 LAB
ALBUMIN SERPL-MCNC: 4.1 G/DL (ref 3.6–5.1)
ALBUMIN/GLOB SERPL: 1.8 (CALC) (ref 1–2.5)
ALP SERPL-CCNC: 76 U/L (ref 37–153)
ALT SERPL-CCNC: 15 U/L (ref 6–29)
ANION GAP SERPL CALCULATED.4IONS-SCNC: 11 MMOL/L (CALC) (ref 7–17)
AST SERPL-CCNC: 18 U/L (ref 10–35)
BILIRUB DIRECT SERPL-MCNC: 0.4 MG/DL
BILIRUB INDIRECT SERPL-MCNC: 1.3 MG/DL (CALC) (ref 0.2–1.2)
BILIRUB SERPL-MCNC: 1.7 MG/DL (ref 0.2–1.2)
BUN SERPL-MCNC: 13 MG/DL (ref 7–25)
BUN/CREAT SERPL: NORMAL (CALC) (ref 6–22)
CALCIUM SERPL-MCNC: 9.4 MG/DL (ref 8.6–10.4)
CHLORIDE SERPL-SCNC: 105 MMOL/L (ref 98–110)
CHOLEST SERPL-MCNC: 133 MG/DL
CHOLEST/HDLC SERPL: 2.3 (CALC)
CO2 SERPL-SCNC: 25 MMOL/L (ref 20–32)
CREAT SERPL-MCNC: 0.84 MG/DL (ref 0.5–1.05)
EGFRCR SERPLBLD CKD-EPI 2021: 77 ML/MIN/1.73M2
EST. AVERAGE GLUCOSE BLD GHB EST-MCNC: 117 MG/DL
EST. AVERAGE GLUCOSE BLD GHB EST-SCNC: 6.5 MMOL/L
GLOBULIN SER CALC-MCNC: 2.3 G/DL (CALC) (ref 1.9–3.7)
GLUCOSE SERPL-MCNC: 90 MG/DL (ref 65–99)
HBA1C MFR BLD: 5.7 %
HDLC SERPL-MCNC: 58 MG/DL
LDLC SERPL CALC-MCNC: 61 MG/DL (CALC)
NONHDLC SERPL-MCNC: 75 MG/DL (CALC)
POTASSIUM SERPL-SCNC: 4.3 MMOL/L (ref 3.5–5.3)
PROT SERPL-MCNC: 6.4 G/DL (ref 6.1–8.1)
SODIUM SERPL-SCNC: 141 MMOL/L (ref 135–146)
TRIGL SERPL-MCNC: 60 MG/DL

## 2026-02-26 ENCOUNTER — APPOINTMENT (OUTPATIENT)
Dept: PRIMARY CARE | Facility: CLINIC | Age: 66
End: 2026-02-26
Payer: MEDICARE